# Patient Record
Sex: FEMALE | Race: WHITE
[De-identification: names, ages, dates, MRNs, and addresses within clinical notes are randomized per-mention and may not be internally consistent; named-entity substitution may affect disease eponyms.]

---

## 2024-03-11 ENCOUNTER — NURSE TRIAGE (OUTPATIENT)
Dept: OTHER | Facility: CLINIC | Age: 19
End: 2024-03-11

## 2024-03-11 NOTE — TELEPHONE ENCOUNTER
Location of patient: [place]    Received call from Lona at [facility]; Patient with Red Flag Complaint requesting to establish care with Tami Lemus- Dr. Rodriguez.    Subjective: Caller states \"Her stomach is bothering her.\"     Current Symptoms:   *Limited triage due to the patient not being present during the call.    Intermittent, mid abdominal pain- not present currently  Intermittent dizziness and nausea  HA's- right side above the eye  Was seen by her pediatrician previously for this issue and was told it could possibly be anxiety.  Was also seen by GI MD.  Recently started a new job.    Onset: 1 week ago; worsening    Pain Severity: 2/10;  waxing and waning- HA    Temperature: Denies    What has been tried: Pepcid    LMP:  Last week  Pregnant: No    Recommended disposition: See in Office Today or Tomorrow  Advised [facility] if no available appts.    Care advice provided, patient verbalizes understanding; denies any other questions or concerns; instructed to call back for any new or worsening symptoms.    Patient/Caller agrees with recommended disposition; writer provided warm transfer to Judie at [facility] for appointment scheduling    Attention Provider:  Thank you for allowing me to participate in the care of your patient.  The patient was connected to triage in response to information provided to the [facility].  Please do not respond through this encounter as the response is not directed to a shared pool.    Reason for Disposition   MILD pain (e.g., does not interfere with normal activities) and pain comes and goes (cramps) lasts > 48 hours  (Exception: This same abdominal pain is a chronic symptom recurrent or ongoing AND present > 4 weeks.)    Protocols used: Abdominal Pain - Female-ADULT-[place]

## 2024-03-13 ENCOUNTER — TELEPHONE (OUTPATIENT)
Dept: FAMILY MEDICINE CLINIC | Age: 19
End: 2024-03-13

## 2024-03-13 NOTE — TELEPHONE ENCOUNTER
----- Message from Judie Meyers sent at 3/11/2024  2:25 PM EDT -----  Subject: Appointment Request    Reason for Call: New Patient/New to Provider Appointment needed: Routine   Return from RN Triage    QUESTIONS    Reason for appointment request? No appointments available during search     Additional Information for Provider? Patient mom is a patient here   Marisol Pena and wants her set up as a new pt with Dr. Rodriguez . Call naveen   back to schedule if possible.   ---------------------------------------------------------------------------  --------------  CALL BACK INFO  [phone]; OK to leave message on voicemail  ---------------------------------------------------------------------------  --------------  SCRIPT ANSWERS

## 2024-03-13 NOTE — TELEPHONE ENCOUNTER
Spoke with patient and completed new patient form. Informed her we will call back once form has been reviewed.

## 2024-05-28 ENCOUNTER — OFFICE VISIT (OUTPATIENT)
Dept: FAMILY MEDICINE CLINIC | Age: 19
End: 2024-05-28
Payer: COMMERCIAL

## 2024-05-28 VITALS
HEIGHT: 62 IN | WEIGHT: 168.2 LBS | BODY MASS INDEX: 30.95 KG/M2 | DIASTOLIC BLOOD PRESSURE: 78 MMHG | SYSTOLIC BLOOD PRESSURE: 130 MMHG

## 2024-05-28 DIAGNOSIS — R51.9 CHRONIC DAILY HEADACHE: ICD-10-CM

## 2024-05-28 DIAGNOSIS — Z00.00 WELL ADULT EXAM: Primary | ICD-10-CM

## 2024-05-28 DIAGNOSIS — F41.1 GAD (GENERALIZED ANXIETY DISORDER): ICD-10-CM

## 2024-05-28 PROCEDURE — 99395 PREV VISIT EST AGE 18-39: CPT | Performed by: FAMILY MEDICINE

## 2024-05-28 RX ORDER — IBUPROFEN 100 MG/1
400 TABLET, CHEWABLE ORAL EVERY 6 HOURS PRN
Qty: 90 TABLET | Refills: 3 | Status: SHIPPED | OUTPATIENT
Start: 2024-05-28

## 2024-05-28 RX ORDER — SERTRALINE HYDROCHLORIDE 25 MG/1
25 TABLET, FILM COATED ORAL DAILY
Qty: 30 TABLET | Refills: 1 | Status: SHIPPED | OUTPATIENT
Start: 2024-05-28

## 2024-05-28 SDOH — ECONOMIC STABILITY: FOOD INSECURITY: WITHIN THE PAST 12 MONTHS, YOU WORRIED THAT YOUR FOOD WOULD RUN OUT BEFORE YOU GOT MONEY TO BUY MORE.: NEVER TRUE

## 2024-05-28 SDOH — ECONOMIC STABILITY: FOOD INSECURITY: WITHIN THE PAST 12 MONTHS, THE FOOD YOU BOUGHT JUST DIDN'T LAST AND YOU DIDN'T HAVE MONEY TO GET MORE.: NEVER TRUE

## 2024-05-28 SDOH — ECONOMIC STABILITY: HOUSING INSECURITY
IN THE LAST 12 MONTHS, WAS THERE A TIME WHEN YOU DID NOT HAVE A STEADY PLACE TO SLEEP OR SLEPT IN A SHELTER (INCLUDING NOW)?: NO

## 2024-05-28 SDOH — ECONOMIC STABILITY: INCOME INSECURITY: HOW HARD IS IT FOR YOU TO PAY FOR THE VERY BASICS LIKE FOOD, HOUSING, MEDICAL CARE, AND HEATING?: NOT HARD AT ALL

## 2024-05-28 ASSESSMENT — PATIENT HEALTH QUESTIONNAIRE - PHQ9
SUM OF ALL RESPONSES TO PHQ QUESTIONS 1-9: 0
SUM OF ALL RESPONSES TO PHQ QUESTIONS 1-9: 0
SUM OF ALL RESPONSES TO PHQ9 QUESTIONS 1 & 2: 0
2. FEELING DOWN, DEPRESSED OR HOPELESS: NOT AT ALL
SUM OF ALL RESPONSES TO PHQ QUESTIONS 1-9: 0
SUM OF ALL RESPONSES TO PHQ QUESTIONS 1-9: 0
1. LITTLE INTEREST OR PLEASURE IN DOING THINGS: NOT AT ALL

## 2024-05-28 ASSESSMENT — ENCOUNTER SYMPTOMS
RESPIRATORY NEGATIVE: 1
GASTROINTESTINAL NEGATIVE: 1

## 2024-05-28 NOTE — PROGRESS NOTES
OUTPATIENT PROGRESS NOTE  Date of Service:  5/28/2024  Address: [facility]  [facility]  [address]  [address]  [place] [facility] [address]  Dept: [phone]  Loc: [phone]    Subjective:      Patient ID:  [id number]  Mariya Pena is a 18 y.o. female     HPI  Well exam   Here to establish  Francy Pena's daughter   one of the triplets  She is working at [employer] right now  not sure what she wants to do yet  Has headaches daily   wakes up with them above her left eye  Going on for a long time  she cannot swallow pills so she takes  Goody's headache powder with is acetaminophen, caffeine and aspirin  Also has a few headaches yearly which are migraines   Also she has anxiety   especially in the evening  her mom wants her to try medication to help    She has great anxiety about nausea and vomiting   Not depressed  No suicidal thoughts  no self hurting   Review of Systems   Constitutional: Negative.    HENT: Negative.     Respiratory: Negative.     Cardiovascular: Negative.    Gastrointestinal: Negative.    Neurological:  Positive for headaches.   Psychiatric/Behavioral:  Positive for agitation and decreased concentration. The patient is nervous/anxious.        Objective:   YOB: 2005    Date of Visit:  5/28/2024     No Known Allergies    No outpatient medications have been marked as taking for the 5/28/24 encounter (Office Visit) with Dakotah Rodriguez DO.       Vitals:    05/28/24 0957   BP: 130/78   Weight: 76.3 kg (168 lb 3.2 oz)   Height: 1.58 m (5' 2.21\")     Body mass index is 30.56 kg/m².     Wt Readings from Last 3 Encounters:   05/28/24 76.3 kg (168 lb 3.2 oz) (91 %, Z= 1.36)*     * Growth percentiles are based on CDC (Girls, 2-20 Years) data.     BP Readings from Last 3 Encounters:   05/28/24 130/78       Physical Exam  Vitals and nursing note reviewed.   Constitutional:       Appearance: She is well-developed.   HENT:      Head: Normocephalic.

## 2024-06-28 ENCOUNTER — OFFICE VISIT (OUTPATIENT)
Dept: FAMILY MEDICINE CLINIC | Age: 19
End: 2024-06-28
Payer: COMMERCIAL

## 2024-06-28 VITALS
BODY MASS INDEX: 31.06 KG/M2 | HEIGHT: 62 IN | WEIGHT: 168.8 LBS | SYSTOLIC BLOOD PRESSURE: 130 MMHG | DIASTOLIC BLOOD PRESSURE: 78 MMHG

## 2024-06-28 DIAGNOSIS — F41.1 GAD (GENERALIZED ANXIETY DISORDER): ICD-10-CM

## 2024-06-28 PROCEDURE — G8417 CALC BMI ABV UP PARAM F/U: HCPCS | Performed by: FAMILY MEDICINE

## 2024-06-28 PROCEDURE — 99213 OFFICE O/P EST LOW 20 MIN: CPT | Performed by: FAMILY MEDICINE

## 2024-06-28 PROCEDURE — G8427 DOCREV CUR MEDS BY ELIG CLIN: HCPCS | Performed by: FAMILY MEDICINE

## 2024-06-28 PROCEDURE — 1036F TOBACCO NON-USER: CPT | Performed by: FAMILY MEDICINE

## 2024-06-28 ASSESSMENT — ENCOUNTER SYMPTOMS
GASTROINTESTINAL NEGATIVE: 1
RESPIRATORY NEGATIVE: 1

## 2024-06-28 NOTE — PROGRESS NOTES
OUTPATIENT PROGRESS NOTE  Date of Service:  6/28/2024  [place] [facility]  [facility]  [address]  [address]  [place] [facility] [facility] [address]  Dept: [phone]  Loc: [phone]    Subjective:      Patient ID:  [id number]  Mariya Pena is a 18 y.o. female     HPI  Anxiety  She is taking the zoloft 25  no side effects but not noticing much improvement yet  Her mom says she has been happier  Still worries and anxious  Fatigue when she first started but improved       Review of Systems   Constitutional: Negative.    HENT: Negative.     Respiratory: Negative.     Cardiovascular: Negative.    Gastrointestinal: Negative.    Neurological: Negative.    Psychiatric/Behavioral:  Positive for decreased concentration.        Objective:   YOB: 2005    Date of Visit:  6/28/2024     No Known Allergies    Outpatient Medications Marked as Taking for the 6/28/24 encounter (Office Visit) with Dakotah Rodriguez,    Medication Sig Dispense Refill    ibuprofen (IBUPROFEN 100 JAZ STRENGTH) 100 MG chewable tablet Take 4 tablets by mouth every 6 hours as needed (headache) 90 tablet 3    sertraline (ZOLOFT) 25 MG tablet Take 1 tablet by mouth daily 30 tablet 1       Vitals:    06/28/24 0948   BP: 130/78   Weight: 76.6 kg (168 lb 12.8 oz)   Height: 1.58 m (5' 2.21\")     Body mass index is 30.67 kg/m².     Wt Readings from Last 3 Encounters:   06/28/24 76.6 kg (168 lb 12.8 oz) (91 %, Z= 1.37)*   05/28/24 76.3 kg (168 lb 3.2 oz) (91 %, Z= 1.36)*     * Growth percentiles are based on CDC (Girls, 2-20 Years) data.     BP Readings from Last 3 Encounters:   06/28/24 130/78   05/28/24 130/78       Physical Exam  Constitutional:       General: She is not in acute distress.     Appearance: She is well-developed.   HENT:      Head: Normocephalic.   Neurological:      Mental Status: She is alert and oriented to person, place, and time.   Psychiatric:         Behavior: Behavior normal.

## 2024-09-16 DIAGNOSIS — F41.1 GAD (GENERALIZED ANXIETY DISORDER): ICD-10-CM

## 2024-12-02 ENCOUNTER — PATIENT MESSAGE (OUTPATIENT)
Dept: FAMILY MEDICINE CLINIC | Age: 19
End: 2024-12-02

## 2024-12-02 DIAGNOSIS — F41.1 GAD (GENERALIZED ANXIETY DISORDER): Primary | ICD-10-CM

## 2024-12-02 RX ORDER — SERTRALINE HYDROCHLORIDE 100 MG/1
100 TABLET, FILM COATED ORAL DAILY
Qty: 30 TABLET | Refills: 5 | Status: SHIPPED | OUTPATIENT
Start: 2024-12-02

## 2025-05-05 DIAGNOSIS — F41.1 GAD (GENERALIZED ANXIETY DISORDER): ICD-10-CM

## 2025-05-05 RX ORDER — SERTRALINE HYDROCHLORIDE 100 MG/1
100 TABLET, FILM COATED ORAL DAILY
Qty: 90 TABLET | Refills: 0 | Status: SHIPPED | OUTPATIENT
Start: 2025-05-05

## 2025-05-05 NOTE — TELEPHONE ENCOUNTER
Last office visit 6/28/2024    Next office visit scheduled Visit date not found    Requested Prescriptions     Pending Prescriptions Disp Refills    sertraline (ZOLOFT) 100 MG tablet [Pharmacy Med Name: SERTRALINE  MG TABLET] 90 tablet      Sig: Take 1 tablet by mouth daily

## 2025-08-09 DIAGNOSIS — F41.1 GAD (GENERALIZED ANXIETY DISORDER): ICD-10-CM

## 2025-08-09 RX ORDER — SERTRALINE HYDROCHLORIDE 100 MG/1
100 TABLET, FILM COATED ORAL DAILY
Qty: 90 TABLET | Refills: 0 | Status: SHIPPED | OUTPATIENT
Start: 2025-08-09